# Patient Record
Sex: FEMALE | Race: WHITE
[De-identification: names, ages, dates, MRNs, and addresses within clinical notes are randomized per-mention and may not be internally consistent; named-entity substitution may affect disease eponyms.]

---
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
## 2022-01-28 NOTE — XR
EXAMINATION TYPE: XR ribs bilat w pa chest xray

 

DATE OF EXAM: 1/28/2022

 

COMPARISON: None

 

HISTORY: R07.81

 

TECHNIQUE: Chest is examined in the frontal projection. 2 views bilateral ribs were obtained each.

 

FINDINGS: Heart size normal. Pulmonary vasculature is normal. The lungs are clear. No pneumothorax is
 evident.

 

Right ribs are intact. No acute or subacute fractures are evident.

 

IMPRESSION:

1.  No acute pulmonary process.

2. No acute displaced rib fractures identified.

## 2022-04-27 ENCOUNTER — HOSPITAL ENCOUNTER (OUTPATIENT)
Dept: HOSPITAL 47 - RADMRIMAIN | Age: 22
Discharge: HOME | End: 2022-04-27
Attending: ORTHOPAEDIC SURGERY
Payer: COMMERCIAL

## 2022-04-27 DIAGNOSIS — M25.562: Primary | ICD-10-CM
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
## 2022-07-27 ENCOUNTER — HOSPITAL ENCOUNTER (EMERGENCY)
Dept: HOSPITAL 47 - EC | Age: 22
Discharge: HOME | End: 2022-07-27
Payer: COMMERCIAL

## 2022-07-27 VITALS — DIASTOLIC BLOOD PRESSURE: 78 MMHG | HEART RATE: 78 BPM | TEMPERATURE: 97.8 F | SYSTOLIC BLOOD PRESSURE: 114 MMHG

## 2022-07-27 VITALS — RESPIRATION RATE: 18 BRPM

## 2022-07-27 DIAGNOSIS — F41.9: ICD-10-CM

## 2022-07-27 DIAGNOSIS — R07.89: Primary | ICD-10-CM

## 2022-07-27 PROCEDURE — 71046 X-RAY EXAM CHEST 2 VIEWS: CPT

## 2022-07-27 PROCEDURE — 93005 ELECTROCARDIOGRAM TRACING: CPT

## 2022-07-27 PROCEDURE — 99285 EMERGENCY DEPT VISIT HI MDM: CPT
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
## 2022-07-27 NOTE — XR
EXAMINATION TYPE: XR chest 2V

 

DATE OF EXAM: 7/27/2022

 

COMPARISON: 1/20/2022

 

INDICATION: Infiltrate, chest pain

 

TECHNIQUE: Single frontal view of the chest is obtained.

 

FINDINGS:  

The heart size is normal.  

The pulmonary vasculature is normal.  

The lungs are clear.  

 

 

IMPRESSION:  

1. No acute pulmonary process.

## 2022-08-17 ENCOUNTER — HOSPITAL ENCOUNTER (OUTPATIENT)
Dept: HOSPITAL 47 - RADECHMAIN | Age: 22
Discharge: HOME | End: 2022-08-17
Attending: INTERNAL MEDICINE
Payer: COMMERCIAL

## 2022-08-17 DIAGNOSIS — R07.9: Primary | ICD-10-CM

## 2022-08-17 PROCEDURE — 93270 REMOTE 30 DAY ECG REV/REPORT: CPT
